# Patient Record
Sex: FEMALE | Race: WHITE | NOT HISPANIC OR LATINO
[De-identification: names, ages, dates, MRNs, and addresses within clinical notes are randomized per-mention and may not be internally consistent; named-entity substitution may affect disease eponyms.]

---

## 2018-06-22 ENCOUNTER — APPOINTMENT (OUTPATIENT)
Dept: OBGYN | Facility: CLINIC | Age: 58
End: 2018-06-22
Payer: COMMERCIAL

## 2018-06-22 VITALS
WEIGHT: 105 LBS | HEIGHT: 64 IN | SYSTOLIC BLOOD PRESSURE: 114 MMHG | BODY MASS INDEX: 17.93 KG/M2 | DIASTOLIC BLOOD PRESSURE: 58 MMHG

## 2018-06-22 DIAGNOSIS — Z00.00 ENCOUNTER FOR GENERAL ADULT MEDICAL EXAMINATION W/OUT ABNORMAL FINDINGS: ICD-10-CM

## 2018-06-22 PROCEDURE — 99396 PREV VISIT EST AGE 40-64: CPT

## 2018-06-27 LAB — CYTOLOGY CVX/VAG DOC THIN PREP: NORMAL

## 2018-11-15 ENCOUNTER — EMERGENCY (EMERGENCY)
Facility: HOSPITAL | Age: 58
LOS: 1 days | End: 2018-11-15
Payer: COMMERCIAL

## 2018-11-15 PROCEDURE — 99284 EMERGENCY DEPT VISIT MOD MDM: CPT

## 2018-11-15 PROCEDURE — 70487 CT MAXILLOFACIAL W/DYE: CPT | Mod: 26

## 2019-09-23 ENCOUNTER — APPOINTMENT (OUTPATIENT)
Dept: OBGYN | Facility: CLINIC | Age: 59
End: 2019-09-23
Payer: COMMERCIAL

## 2019-09-23 VITALS
BODY MASS INDEX: 18.61 KG/M2 | DIASTOLIC BLOOD PRESSURE: 58 MMHG | WEIGHT: 109 LBS | HEIGHT: 64 IN | SYSTOLIC BLOOD PRESSURE: 105 MMHG

## 2019-09-23 PROCEDURE — 99396 PREV VISIT EST AGE 40-64: CPT

## 2019-09-23 NOTE — DISCUSSION/SUMMARY
[FreeTextEntry1] : a59 years old white female came to the office for annual exam physical and pelvic exams within normal limits Pap smear done within the patient for mammogram a bone density I spent 25 minutes the patient

## 2019-09-23 NOTE — PHYSICAL EXAM
[Awake] : awake [Alert] : alert [Acute Distress] : no acute distress [Mass] : no breast mass [Axillary LAD] : no axillary lymphadenopathy [Nipple Discharge] : no nipple discharge [Soft] : soft [Tender] : non tender [Oriented x3] : oriented to person, place, and time [Normal] : cervix [Uterine Adnexae] : were not tender and not enlarged [No Bleeding] : there was no active vaginal bleeding

## 2019-09-24 LAB — HPV HIGH+LOW RISK DNA PNL CVX: NOT DETECTED

## 2019-09-29 LAB — CYTOLOGY CVX/VAG DOC THIN PREP: ABNORMAL

## 2019-10-07 ENCOUNTER — APPOINTMENT (OUTPATIENT)
Dept: RADIOLOGY | Facility: CLINIC | Age: 59
End: 2019-10-07

## 2019-10-07 ENCOUNTER — APPOINTMENT (OUTPATIENT)
Dept: MAMMOGRAPHY | Facility: CLINIC | Age: 59
End: 2019-10-07
Payer: COMMERCIAL

## 2019-10-07 PROCEDURE — 77067 SCR MAMMO BI INCL CAD: CPT

## 2019-10-07 PROCEDURE — 77063 BREAST TOMOSYNTHESIS BI: CPT

## 2019-10-07 PROCEDURE — 77080 DXA BONE DENSITY AXIAL: CPT

## 2019-10-14 ENCOUNTER — APPOINTMENT (OUTPATIENT)
Dept: CARDIOLOGY | Facility: CLINIC | Age: 59
End: 2019-10-14
Payer: COMMERCIAL

## 2019-10-14 ENCOUNTER — NON-APPOINTMENT (OUTPATIENT)
Age: 59
End: 2019-10-14

## 2019-10-14 VITALS
HEART RATE: 68 BPM | DIASTOLIC BLOOD PRESSURE: 68 MMHG | OXYGEN SATURATION: 98 % | SYSTOLIC BLOOD PRESSURE: 118 MMHG | BODY MASS INDEX: 18.78 KG/M2 | HEIGHT: 64 IN | WEIGHT: 110 LBS

## 2019-10-14 DIAGNOSIS — R00.2 PALPITATIONS: ICD-10-CM

## 2019-10-14 DIAGNOSIS — Z82.49 FAMILY HISTORY OF ISCHEMIC HEART DISEASE AND OTHER DISEASES OF THE CIRCULATORY SYSTEM: ICD-10-CM

## 2019-10-14 DIAGNOSIS — Z86.79 PERSONAL HISTORY OF OTHER DISEASES OF THE CIRCULATORY SYSTEM: ICD-10-CM

## 2019-10-14 DIAGNOSIS — M35.8 OTHER SPECIFIED SYSTEMIC INVOLVEMENT OF CONNECTIVE TISSUE: ICD-10-CM

## 2019-10-14 PROCEDURE — 99205 OFFICE O/P NEW HI 60 MIN: CPT

## 2019-10-14 RX ORDER — HYDROXYCHLOROQUINE SULFATE 200 MG/1
200 TABLET, FILM COATED ORAL DAILY
Refills: 0 | Status: ACTIVE | COMMUNITY
Start: 2018-03-26

## 2019-10-14 NOTE — ASSESSMENT
[FreeTextEntry1] : There is a high complexity to the medical decision making in this case.  There is an extensive number of diagnostic or management options regarding the chief complaint.  These include stress test, echo, cardiac cath, b blocker . The chief complaint is an illness which causes threat to life.\par \par All of the data entered by staff today into the electronic health record  has been reviewed.  I am in agreement with all of the data.\par \par Palpitations: 24-hour Holter monitor has been arranged.\par \par Shortness of breath and chest discomfort: Transthoracic echocardiography is indicated to assess for pericardial disease. Exercise echocardiography is indicated to assess for ischemia.

## 2019-10-14 NOTE — PHYSICAL EXAM
[Normal Appearance] : normal appearance [General Appearance - Well Developed] : well developed [Well Groomed] : well groomed [General Appearance - Well Nourished] : well nourished [General Appearance - In No Acute Distress] : no acute distress [No Deformities] : no deformities [Normal Conjunctiva] : the conjunctiva exhibited no abnormalities [Eyelids - No Xanthelasma] : the eyelids demonstrated no xanthelasmas [Normal Oral Mucosa] : normal oral mucosa [No Oral Pallor] : no oral pallor [No Oral Cyanosis] : no oral cyanosis [Normal Jugular Venous A Waves Present] : normal jugular venous A waves present [Normal Jugular Venous V Waves Present] : normal jugular venous V waves present [No Jugular Venous Muller A Waves] : no jugular venous muller A waves [Heart Rate And Rhythm] : heart rate and rhythm were normal [Heart Sounds] : normal S1 and S2 [Murmurs] : no murmurs present [Respiration, Rhythm And Depth] : normal respiratory rhythm and effort [Exaggerated Use Of Accessory Muscles For Inspiration] : no accessory muscle use [Auscultation Breath Sounds / Voice Sounds] : lungs were clear to auscultation bilaterally [Abdomen Soft] : soft [Abdomen Mass (___ Cm)] : no abdominal mass palpated [Abdomen Tenderness] : non-tender [Gait - Sufficient For Exercise Testing] : the gait was sufficient for exercise testing [Abnormal Walk] : normal gait [Nail Clubbing] : no clubbing of the fingernails [Cyanosis, Localized] : no localized cyanosis [Petechial Hemorrhages (___cm)] : no petechial hemorrhages [] : no rash [Skin Color & Pigmentation] : normal skin color and pigmentation [Skin Lesions] : no skin lesions [No Venous Stasis] : no venous stasis [No Skin Ulcers] : no skin ulcer [No Xanthoma] : no  xanthoma was observed [Oriented To Time, Place, And Person] : oriented to person, place, and time [Mood] : the mood was normal [Affect] : the affect was normal [No Anxiety] : not feeling anxious

## 2019-10-14 NOTE — HISTORY OF PRESENT ILLNESS
[FreeTextEntry1] : The patient is complaining of palpitations and shortness of breath.\par \par The patient is an extremely stressful year. She lost one of her relatives as well as a pet. 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 She carries things around and walks all day long without exertional chest discomfort or shortness of breath. For the last year the patient has episodic palpitations mostly at night when she settles. Fluttering-like sensation. She believes the last up to minutes a time. It occurs nearly every night. Simultaneously she felt shortness of breath syndrome. She is also short of breath mostly at rest. This does not typically occur when she exerts herself.  SOB is mild, no other assoc sx, last minutes, context stress.  \par \par Patient also fell down recently. Her elbow into her chest. Since that time she's had chest discomfort. It is nearly continuous in nature. It is not related to exertion.

## 2019-10-16 PROCEDURE — 93224 XTRNL ECG REC UP TO 48 HRS: CPT

## 2019-11-04 ENCOUNTER — APPOINTMENT (OUTPATIENT)
Dept: CARDIOLOGY | Facility: CLINIC | Age: 59
End: 2019-11-04
Payer: COMMERCIAL

## 2019-11-04 PROCEDURE — 93306 TTE W/DOPPLER COMPLETE: CPT

## 2019-11-07 ENCOUNTER — OUTPATIENT (OUTPATIENT)
Dept: OUTPATIENT SERVICES | Facility: HOSPITAL | Age: 59
LOS: 1 days | End: 2019-11-07

## 2019-11-13 ENCOUNTER — APPOINTMENT (OUTPATIENT)
Dept: CARDIOLOGY | Facility: CLINIC | Age: 59
End: 2019-11-13
Payer: COMMERCIAL

## 2019-11-13 PROCEDURE — 93351 STRESS TTE COMPLETE: CPT

## 2019-11-18 ENCOUNTER — APPOINTMENT (OUTPATIENT)
Dept: CARDIOLOGY | Facility: CLINIC | Age: 59
End: 2019-11-18
Payer: COMMERCIAL

## 2019-11-18 VITALS
DIASTOLIC BLOOD PRESSURE: 60 MMHG | HEART RATE: 63 BPM | SYSTOLIC BLOOD PRESSURE: 120 MMHG | BODY MASS INDEX: 18.61 KG/M2 | HEIGHT: 64 IN | WEIGHT: 109 LBS | OXYGEN SATURATION: 100 %

## 2019-11-18 DIAGNOSIS — I34.0 NONRHEUMATIC MITRAL (VALVE) INSUFFICIENCY: ICD-10-CM

## 2019-11-18 DIAGNOSIS — I49.3 VENTRICULAR PREMATURE DEPOLARIZATION: ICD-10-CM

## 2019-11-18 DIAGNOSIS — R06.02 SHORTNESS OF BREATH: ICD-10-CM

## 2019-11-18 PROCEDURE — 99214 OFFICE O/P EST MOD 30 MIN: CPT

## 2019-11-18 NOTE — PHYSICAL EXAM
[Normal Appearance] : normal appearance [General Appearance - Well Developed] : well developed [Well Groomed] : well groomed [General Appearance - Well Nourished] : well nourished [No Deformities] : no deformities [General Appearance - In No Acute Distress] : no acute distress [Normal Conjunctiva] : the conjunctiva exhibited no abnormalities [Eyelids - No Xanthelasma] : the eyelids demonstrated no xanthelasmas [Normal Oral Mucosa] : normal oral mucosa [No Oral Pallor] : no oral pallor [Normal Jugular Venous A Waves Present] : normal jugular venous A waves present [No Oral Cyanosis] : no oral cyanosis [Normal Jugular Venous V Waves Present] : normal jugular venous V waves present [No Jugular Venous Muller A Waves] : no jugular venous muller A waves [Respiration, Rhythm And Depth] : normal respiratory rhythm and effort [] : no respiratory distress [Exaggerated Use Of Accessory Muscles For Inspiration] : no accessory muscle use [Auscultation Breath Sounds / Voice Sounds] : lungs were clear to auscultation bilaterally [Heart Rate And Rhythm] : heart rate and rhythm were normal [Heart Sounds] : normal S1 and S2 [Murmurs] : no murmurs present

## 2019-11-18 NOTE — HISTORY OF PRESENT ILLNESS
[FreeTextEntry1] : PVC's: frequent,  palps have resolved.  No syncope, No near syncope.  Structually normal heart.  Continue observation.\par \par SOB: none recently, excellent exercise capacity on stress test.\par \par Mild MR:  Asx

## 2020-10-12 ENCOUNTER — APPOINTMENT (OUTPATIENT)
Dept: OBGYN | Facility: CLINIC | Age: 60
End: 2020-10-12